# Patient Record
Sex: FEMALE | Race: WHITE | NOT HISPANIC OR LATINO | URBAN - METROPOLITAN AREA
[De-identification: names, ages, dates, MRNs, and addresses within clinical notes are randomized per-mention and may not be internally consistent; named-entity substitution may affect disease eponyms.]

---
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
## 2024-10-03 ENCOUNTER — APPOINTMENT (OUTPATIENT)
Dept: ULTRASOUND IMAGING | Age: 19
End: 2024-10-03
Attending: STUDENT IN AN ORGANIZED HEALTH CARE EDUCATION/TRAINING PROGRAM
Payer: COMMERCIAL

## 2024-10-03 ENCOUNTER — HOSPITAL ENCOUNTER (EMERGENCY)
Age: 19
Discharge: HOME | End: 2024-10-03
Attending: STUDENT IN AN ORGANIZED HEALTH CARE EDUCATION/TRAINING PROGRAM
Payer: COMMERCIAL

## 2024-10-03 VITALS
BODY MASS INDEX: 24.11 KG/M2 | WEIGHT: 150 LBS | HEART RATE: 67 BPM | TEMPERATURE: 98 F | OXYGEN SATURATION: 99 % | HEIGHT: 66 IN | DIASTOLIC BLOOD PRESSURE: 66 MMHG | SYSTOLIC BLOOD PRESSURE: 114 MMHG | RESPIRATION RATE: 16 BRPM

## 2024-10-03 DIAGNOSIS — N83.209 HEMORRHAGIC OVARIAN CYST: Primary | ICD-10-CM

## 2024-10-03 LAB
ALBUMIN SERPL-MCNC: 3.9 G/DL (ref 3.4–5)
ALBUMIN/GLOB SERPL: 1.1 {RATIO} (ref 1–2)
ALP LIVER SERPL-CCNC: 47 U/L
ALT SERPL-CCNC: 12 U/L
ANION GAP SERPL CALC-SCNC: 4 MMOL/L (ref 0–18)
AST SERPL-CCNC: 11 U/L (ref 15–37)
BASOPHILS # BLD AUTO: 0.03 X10(3) UL (ref 0–0.2)
BASOPHILS NFR BLD AUTO: 0.3 %
BILIRUB SERPL-MCNC: 0.2 MG/DL (ref 0.1–2)
BILIRUB UR QL STRIP.AUTO: NEGATIVE
BUN BLD-MCNC: 14 MG/DL (ref 9–23)
CALCIUM BLD-MCNC: 8.8 MG/DL (ref 8.5–10.1)
CHLORIDE SERPL-SCNC: 106 MMOL/L (ref 98–112)
CLARITY UR REFRACT.AUTO: CLEAR
CO2 SERPL-SCNC: 27 MMOL/L (ref 21–32)
COLOR UR AUTO: YELLOW
CREAT BLD-MCNC: 0.76 MG/DL
EGFRCR SERPLBLD CKD-EPI 2021: 114 ML/MIN/1.73M2 (ref 60–?)
EOSINOPHIL # BLD AUTO: 0.25 X10(3) UL (ref 0–0.7)
EOSINOPHIL NFR BLD AUTO: 2.9 %
ERYTHROCYTE [DISTWIDTH] IN BLOOD BY AUTOMATED COUNT: 16.8 %
GLOBULIN PLAS-MCNC: 3.5 G/DL (ref 2.8–4.4)
GLUCOSE BLD-MCNC: 88 MG/DL (ref 70–99)
GLUCOSE UR STRIP.AUTO-MCNC: NEGATIVE MG/DL
HCT VFR BLD AUTO: 33 %
HGB BLD-MCNC: 10.1 G/DL
IMM GRANULOCYTES # BLD AUTO: 0.02 X10(3) UL (ref 0–1)
IMM GRANULOCYTES NFR BLD: 0.2 %
KETONES UR STRIP.AUTO-MCNC: NEGATIVE MG/DL
LEUKOCYTE ESTERASE UR QL STRIP.AUTO: NEGATIVE
LYMPHOCYTES # BLD AUTO: 2.45 X10(3) UL (ref 1–4)
LYMPHOCYTES NFR BLD AUTO: 28.3 %
MCH RBC QN AUTO: 22.8 PG (ref 26–34)
MCHC RBC AUTO-ENTMCNC: 30.6 G/DL (ref 31–37)
MCV RBC AUTO: 74.5 FL
MONOCYTES # BLD AUTO: 0.84 X10(3) UL (ref 0.1–1)
MONOCYTES NFR BLD AUTO: 9.7 %
NEUTROPHILS # BLD AUTO: 5.07 X10 (3) UL (ref 1.5–7.7)
NEUTROPHILS # BLD AUTO: 5.07 X10(3) UL (ref 1.5–7.7)
NEUTROPHILS NFR BLD AUTO: 58.6 %
NITRITE UR QL STRIP.AUTO: NEGATIVE
OSMOLALITY SERPL CALC.SUM OF ELEC: 284 MOSM/KG (ref 275–295)
PH UR STRIP.AUTO: 7 [PH] (ref 5–8)
PLATELET # BLD AUTO: 449 10(3)UL (ref 150–450)
POTASSIUM SERPL-SCNC: 3.3 MMOL/L (ref 3.5–5.1)
PROT SERPL-MCNC: 7.4 G/DL (ref 6.4–8.2)
PROT UR STRIP.AUTO-MCNC: NEGATIVE MG/DL
RBC # BLD AUTO: 4.43 X10(6)UL
RBC UR QL AUTO: NEGATIVE
SODIUM SERPL-SCNC: 137 MMOL/L (ref 136–145)
SP GR UR STRIP.AUTO: 1.01 (ref 1–1.03)
UROBILINOGEN UR STRIP.AUTO-MCNC: 0.2 MG/DL
WBC # BLD AUTO: 8.7 X10(3) UL (ref 4–11)

## 2024-10-03 PROCEDURE — 76830 TRANSVAGINAL US NON-OB: CPT | Performed by: STUDENT IN AN ORGANIZED HEALTH CARE EDUCATION/TRAINING PROGRAM

## 2024-10-03 PROCEDURE — 80053 COMPREHEN METABOLIC PANEL: CPT | Performed by: STUDENT IN AN ORGANIZED HEALTH CARE EDUCATION/TRAINING PROGRAM

## 2024-10-03 PROCEDURE — 36415 COLL VENOUS BLD VENIPUNCTURE: CPT

## 2024-10-03 PROCEDURE — 99284 EMERGENCY DEPT VISIT MOD MDM: CPT

## 2024-10-03 PROCEDURE — 81003 URINALYSIS AUTO W/O SCOPE: CPT | Performed by: STUDENT IN AN ORGANIZED HEALTH CARE EDUCATION/TRAINING PROGRAM

## 2024-10-03 PROCEDURE — 76856 US EXAM PELVIC COMPLETE: CPT | Performed by: STUDENT IN AN ORGANIZED HEALTH CARE EDUCATION/TRAINING PROGRAM

## 2024-10-03 PROCEDURE — 93975 VASCULAR STUDY: CPT | Performed by: STUDENT IN AN ORGANIZED HEALTH CARE EDUCATION/TRAINING PROGRAM

## 2024-10-03 PROCEDURE — 85025 COMPLETE CBC W/AUTO DIFF WBC: CPT | Performed by: STUDENT IN AN ORGANIZED HEALTH CARE EDUCATION/TRAINING PROGRAM

## 2024-10-04 NOTE — ED PROVIDER NOTES
History     Chief Complaint   Patient presents with    Abdomen/Flank Pain       HPI    19 year old female with history of abnormal uterine bleeding, on Nexplanon presents with suprapubic/lower pelvic pain intermittently since this afternoon.  Reports she sat down to urinate in the toilet and felt severe cramping.  She no fevers or vomiting or diarrhea.  No bloody stools.  Normal bowel movement today.  No urinary symptoms.          History reviewed. No pertinent past medical history.    History reviewed. No pertinent surgical history.    Social History     Socioeconomic History    Marital status: Single   Tobacco Use    Smoking status: Never    Smokeless tobacco: Current     Types: Chew   Vaping Use    Vaping status: Some Days   Substance and Sexual Activity    Alcohol use: Yes     Comment: occ    Drug use: Yes     Types: Cannabis                   Physical Exam     ED Triage Vitals [10/03/24 2033]   /74   Pulse 90   Resp 16   Temp 98.2 °F (36.8 °C)   Temp src    SpO2 100 %   O2 Device None (Room air)       Physical Exam  Constitutional:       General: She is not in acute distress.  Eyes:      Extraocular Movements: Extraocular movements intact.   Cardiovascular:      Rate and Rhythm: Normal rate.      Pulses: Normal pulses.   Pulmonary:      Effort: Pulmonary effort is normal. No respiratory distress.   Abdominal:      Tenderness: There is abdominal tenderness (Mild tenderness in the suprapubic region and left lower pelvic).   Musculoskeletal:      Cervical back: Normal range of motion.   Neurological:      General: No focal deficit present.      Mental Status: She is alert.              ED Course     Labs Reviewed   CBC WITH DIFFERENTIAL WITH PLATELET - Abnormal; Notable for the following components:       Result Value    HGB 10.1 (*)     HCT 33.0 (*)     MCV 74.5 (*)     MCH 22.8 (*)     MCHC 30.6 (*)     All other components within normal limits   COMP METABOLIC PANEL (14) - Abnormal; Notable for the  following components:    Potassium 3.3 (*)     AST 11 (*)     ALT 12 (*)     Alkaline Phosphatase 47 (*)     All other components within normal limits   URINALYSIS, ROUTINE     US PELVIS EV W DOPPLER (CPT=76856/50526/95162)    Result Date: 10/3/2024  CONCLUSION:  There is a moderate amount of fluid present within the pelvis with a complex appearing cyst most likely representing a hemorrhagic cyst.  The findings are suggestive of a recently ruptured cyst.  A follow-up exam with pelvic ultrasound is suggested in approximately 3 months to ensure resolution.   LOCATION:  [facility]     Dictated by (CST): Fredrick Henry MD on 10/03/2024 at 10:27 PM     Finalized by (CST): Fredrick Henry MD on 10/03/2024 at 10:29 PM            MDM     Vitals:    10/03/24 2033   BP: 126/74   Pulse: 90   Resp: 16   Temp: 98.2 °F (36.8 °C)   SpO2: 100%   Weight: 68 kg   Height: 167.6 cm (5' 6\")       Cystitis, constipation, ovarian cystic, less likely torsion on differential.    ED Course as of 10/03/24 2242  ------------------------------------------------------------  Time: 10/03 2237  Comment: Ultrasound is showing what appears to be hemorrhagic cysts with pelvic free fluid.  No evidence of torsion.  Labs are unrevealing  ------------------------------------------------------------  Time: 10/03 2241  Comment: Discussed all findings.  Nsaids, obgyn f/u  Patient is feeling well to be discharged.  Vitals remain stable.  Ambulating without difficulty.  Given written and verbal instructions regarding this condition and strict return precautions.   Will follow up in clinic.   Patient verbalizes understanding of instructions and follow up plan, as well as indications to return to the emergency department.           Disposition and Plan     Clinical Impression:  1. Hemorrhagic ovarian cyst        Disposition:  Discharge    Follow-up:  obgyn    Follow up        Medications Prescribed:  Current Discharge Medication List

## 2024-10-04 NOTE — ED INITIAL ASSESSMENT (HPI)
Pt states felt like she had to urinate. States when she sat down she had an intense pain to lower abd that radiates into rectum. Denies n/v, changes in urine or stool.

## (undated) NOTE — LETTER
Date & Time: 10/3/2024, 11:23 PM  Patient: Laura Giles  Encounter Provider(s):    Navarro Asencio MD       To Whom It May Concern:    Laura Giles was seen and treated in our department on 10/3/2024. She should not return to work until 10/05/24 .    If you have any questions or concerns, please do not hesitate to call.        _____________________________  Physician/AP[facility] Signature